# Patient Record
Sex: FEMALE | Race: WHITE | NOT HISPANIC OR LATINO | Employment: FULL TIME | URBAN - METROPOLITAN AREA
[De-identification: names, ages, dates, MRNs, and addresses within clinical notes are randomized per-mention and may not be internally consistent; named-entity substitution may affect disease eponyms.]

---

## 2017-11-01 ENCOUNTER — OFFICE VISIT (OUTPATIENT)
Dept: URGENT CARE | Facility: CLINIC | Age: 67
End: 2017-11-01
Payer: COMMERCIAL

## 2017-11-01 VITALS
DIASTOLIC BLOOD PRESSURE: 86 MMHG | RESPIRATION RATE: 16 BRPM | OXYGEN SATURATION: 95 % | TEMPERATURE: 97.6 F | HEART RATE: 85 BPM | BODY MASS INDEX: 37.56 KG/M2 | SYSTOLIC BLOOD PRESSURE: 132 MMHG | WEIGHT: 220 LBS | HEIGHT: 64 IN

## 2017-11-01 DIAGNOSIS — J02.9 ACUTE PHARYNGITIS, UNSPECIFIED ETIOLOGY: ICD-10-CM

## 2017-11-01 DIAGNOSIS — J39.2 SWELLING OF THROAT: ICD-10-CM

## 2017-11-01 LAB
INT CON NEG: NEGATIVE
INT CON POS: POSITIVE
S PYO AG THROAT QL: NEGATIVE

## 2017-11-01 PROCEDURE — 87880 STREP A ASSAY W/OPTIC: CPT | Performed by: NURSE PRACTITIONER

## 2017-11-01 PROCEDURE — 99204 OFFICE O/P NEW MOD 45 MIN: CPT | Performed by: NURSE PRACTITIONER

## 2017-11-01 RX ORDER — CELECOXIB 100 MG/1
CAPSULE ORAL
Refills: 11 | COMMUNITY
Start: 2017-10-10

## 2017-11-01 RX ORDER — AZITHROMYCIN 250 MG/1
TABLET, FILM COATED ORAL
Qty: 6 TAB | Refills: 0 | Status: SHIPPED | OUTPATIENT
Start: 2017-11-01

## 2017-11-01 RX ORDER — DULOXETIN HYDROCHLORIDE 60 MG/1
CAPSULE, DELAYED RELEASE ORAL
Refills: 11 | COMMUNITY
Start: 2017-10-18

## 2017-11-01 RX ORDER — LEVOTHYROXINE SODIUM 75 MCG
TABLET ORAL
Refills: 2 | COMMUNITY
Start: 2017-10-17

## 2017-11-01 RX ORDER — OMEPRAZOLE 40 MG/1
CAPSULE, DELAYED RELEASE ORAL
Refills: 3 | COMMUNITY
Start: 2017-08-18

## 2017-11-01 RX ORDER — HYDROCODONE BITARTRATE AND ACETAMINOPHEN 5; 325 MG/1; MG/1
TABLET ORAL
Refills: 0 | COMMUNITY
Start: 2017-09-27

## 2017-11-01 RX ORDER — PREDNISONE 20 MG/1
20 TABLET ORAL 2 TIMES DAILY
Qty: 10 TAB | Refills: 0 | Status: SHIPPED | OUTPATIENT
Start: 2017-11-01 | End: 2017-11-06

## 2017-11-01 NOTE — PROGRESS NOTES
Chief Complaint   Patient presents with   • Pharyngitis     x 10 days, sore throat, pain to swallow, lossing voice        HISTORY OF PRESENT ILLNESS: Patient is a 67 y.o. female who presents today due to complaints of a sore throat for the past 10 days. Reports associated malaise, runny nose, mild cough, throat swelling, and pain with swallowing. Denies associated fever, respiratory distress, or difficulty breathing. They have tried OTC medications at home without much improvement. Denies known ill contacts. She was seen by her PCP in [place] last week for the same was told she had viral URI, she has not improved.     There are no active problems to display for this patient.      Allergies:Review of patient's allergies indicates no known allergies.    Current Outpatient Prescriptions Ordered in [facility]   Medication Sig Dispense Refill   • SYNTHROID 75 MCG Tab TK 1 T PO D  2   • omeprazole (PRILOSEC) 40 MG delayed-release capsule TK 1 C PO D  3   • hydrocodone-acetaminophen (NORCO) 5-325 MG Tab per tablet TK 1 T PO Q 8 H PRF PAIN  0   • duloxetine (CYMBALTA) 60 MG Cap DR Particles delayed-release capsule   11   • celecoxib (CELEBREX) 100 MG Cap TK 1 C PO BID. GF CELEBREX  11   • predniSONE (DELTASONE) 20 MG Tab Take 1 Tab by mouth 2 times a day for 5 days. Take with food. 10 Tab 0   • azithromycin (ZITHROMAX) 250 MG Tab Take two tabs on day one followed by one tab on days 2-5. 6 Tab 0     No current Epic-ordered facility-administered medications on file.        Past Medical History:   Diagnosis Date   • Depression    • GERD (gastroesophageal reflux disease)    • Thyroid disease        Social History   Substance Use Topics   • Smoking status: Former Smoker   • Smokeless tobacco: Never Used   • Alcohol use Not on file       No family status information on file.   History reviewed. No pertinent family history.    ROS:  Review of Systems   Constitutional: Positive for malaise, chills. Negative for weight loss and fever.    HENT:  "Positive for sore throat, throat swelling, congestion. Negative for ear pain, nosebleeds.  Eyes: Negative for vision changes.   Cardiovascular: Negative for chest pain, palpitations, orthopnea and leg swelling.   Respiratory: Positive for cough. Negative for sputum production, shortness of breath and wheezing.   Gastrointestinal: Negative for abdominal pain, nausea, vomiting or diarrhea.   Skin: Negative for rash, diaphoresis.     Exam:  Blood pressure 132/86, pulse 85, temperature 36.4 °C (97.6 °F), resp. rate 16, height 1.626 m (5' 4\"), weight 99.8 kg (220 lb), SpO2 95 %.  General: well-nourished, well-developed female in NAD  Head: normocephalic, atraumatic  Eyes: PERRLA, no conjunctival injection, acuity grossly intact, lids normal.  Ears: normal shape and symmetry, no tenderness, no discharge. External canals are without any significant edema or erythema. Tympanic membranes are without any inflammation, no effusion. Gross auditory acuity is intact.  Nose: symmetrical without tenderness, no discharge.  Mouth/Throat: reasonable hygiene. There is minimal erythema, without exudates and tonsillar enlargement present. Handling secretions well, no swelling noted.   Neck: no masses, range of motion within normal limits, no tracheal deviation. No obvious thyroid enlargement.   Lymph: bilateral anterior cervical adenopathy. No supraclavicular adenopathy.   Neuro: alert and oriented. Cranial nerves 1-12 grossly intact. No sensory deficit.   Cardiovascular: regular rate and rhythm. No edema.  Pulmonary: no distress. Chest is symmetrical with respiration, no wheezes, crackles, or rhonchi.   Musculoskeletal: no clubbing, appropriate muscle tone, gait is stable.  Skin: warm, dry, intact, no clubbing, no cyanosis, no rashes.   Psych: appropriate mood, affect, judgement.         Assessment/Plan:  1. Acute pharyngitis, unspecified etiology  POCT Rapid Strep A    azithromycin (ZITHROMAX) 250 MG Tab   2. Swelling of throat  " predniSONE (DELTASONE) 20 MG Tab         POC strep negative.       Will treat with azithromycin and prednisone as directed for worsening. DC celebrex with prednisone, take with food.   OTC  tylenol for pain/fever control. Hand hygiene. Increase fluid intake, rest. Warm salt water gargles.   Supportive care, differential diagnoses, and indications for immediate follow-up discussed with patient.   Pathogenesis of diagnosis discussed including typical length and natural progression.   Instructed to return to clinic or nearest emergency department for any change in condition, further concerns, or worsening of symptoms.  Patient states understanding of the plan of care and discharge instructions.  Instructed to make an appointment, for follow up, with their primary care provider.        Please note that this dictation was created using voice recognition software. I have made every reasonable attempt to correct obvious errors, but I expect that there are errors of grammar and possibly content that I did not discover before finalizing the note.      LINH Fishman.

## 2024-11-21 ENCOUNTER — OFFICE (OUTPATIENT)
Dept: URBAN - METROPOLITAN AREA CLINIC 128 | Facility: CLINIC | Age: 74
End: 2024-11-21

## 2024-11-21 VITALS
HEIGHT: 64 IN | WEIGHT: 224 LBS | SYSTOLIC BLOOD PRESSURE: 154 MMHG | HEART RATE: 82 BPM | DIASTOLIC BLOOD PRESSURE: 82 MMHG

## 2024-11-21 DIAGNOSIS — K46.9: ICD-10-CM

## 2024-11-21 DIAGNOSIS — Z98.890 HISTORY OF REPAIR OF HIATAL HERNIA: ICD-10-CM

## 2024-11-21 PROCEDURE — 99204 OFFICE O/P NEW MOD 45 MIN: CPT

## 2024-11-21 NOTE — SERVICENOTES
This is a new patient for which the plan of care was established with the assistance of the supervising physician. The supervising physician was present in the office at the time of the encounter, and made contact with the patient during the visit. Clinician used AI scribe to assist the note and coding.

## 2024-11-21 NOTE — SERVICEHPINOTES
GEORGINA BRYAN   is seen for an initial visit today. Patient is a 74-year-old female with a history of GERD, arthritis, depression, fibromyalgia, sleep apnea, thyroid disease, and a prior hernia repair in 2022, presenting with a new abdominal lump and mild discomfort. Patient reports the onset of a new abdominal lump and mild discomfort in the area of a previous hernia repair. She underwent hernia repair surgery in October 2022, which involved the placement of mesh. The surgery was reportedly successful, and she had no concerns until recently. She notes that she has been sneezing and coughing frequently due to allergies, particularly in rainy weather, and wonders if this could have contributed to the recurrence of the hernia. She describes the lump as new and different from her previous hernia, which was more diffuse. She denies any significant pain associated with the lump, describing it as mild discomfort. She is concerned about the potential impact of this lump on her upcoming trip to [place] and [place] to visit family.Patient has a history of GERD, which she reports has improved significantly since she was diagnosed with sleep apnea and adjusted her sleeping position. She uses a three-pillow setup to manage her sleep apnea and GERD symptoms. She denies any current difficulty swallowing, abdominal pain, or heartburn. She also has a questionable history of Fuller's esophagus and has been undergoing regular surveillance endoscopies, with the last one performed three years ago. She is unsure if she will be due for the next endoscopy and colonoscopy. No any record available at this visit. Plan to obtain. Patient has a complex medical history and has been seeing multiple healthcare providers. She mentions that her primary care physician, Dr. Arlyn Baires at [facility], was dropped from her insurance network earlier this year, leading to confusion and delays in her care. She is currently seeing a new primary care physician, Dr. Beltran, but is trying to return to her previous doctor. She is also dealing with changes in her insurance coverage and is in the process of switching to Medicare with a supplement. She was told by [facility] after making some certain change, she can back to the system and see her original doctor, and she prefers going back to [facility] to continue the care.
br
brPatient denies fever, nausea, vomiting, dysphagia, reflux, abdominal pain, change in bowel habits, constipation, diarrhea, rectal bleeding, melena, significant change in weight, shortness of breath, and chest pain.  Patient was informed that the conversation was recorded for scribing purposes. Patient has given verbal consent.